# Patient Record
Sex: FEMALE | Race: WHITE | NOT HISPANIC OR LATINO
[De-identification: names, ages, dates, MRNs, and addresses within clinical notes are randomized per-mention and may not be internally consistent; named-entity substitution may affect disease eponyms.]

---

## 2019-01-22 ENCOUNTER — FORM ENCOUNTER (OUTPATIENT)
Age: 59
End: 2019-01-22

## 2019-01-25 ENCOUNTER — FORM ENCOUNTER (OUTPATIENT)
Age: 59
End: 2019-01-25

## 2019-02-10 ENCOUNTER — FORM ENCOUNTER (OUTPATIENT)
Age: 59
End: 2019-02-10

## 2019-02-20 ENCOUNTER — FORM ENCOUNTER (OUTPATIENT)
Age: 59
End: 2019-02-20

## 2020-12-13 ENCOUNTER — FORM ENCOUNTER (OUTPATIENT)
Age: 60
End: 2020-12-13

## 2021-05-10 ENCOUNTER — APPOINTMENT (OUTPATIENT)
Dept: DISASTER EMERGENCY | Facility: OTHER | Age: 61
End: 2021-05-10

## 2021-05-18 ENCOUNTER — APPOINTMENT (OUTPATIENT)
Dept: DISASTER EMERGENCY | Facility: OTHER | Age: 61
End: 2021-05-18
Payer: COMMERCIAL

## 2021-05-18 PROCEDURE — 0012A: CPT

## 2021-11-12 ENCOUNTER — NON-APPOINTMENT (OUTPATIENT)
Age: 61
End: 2021-11-12

## 2021-11-12 DIAGNOSIS — Z86.000 PERSONAL HISTORY OF IN-SITU NEOPLASM OF BREAST: ICD-10-CM

## 2021-11-12 DIAGNOSIS — Z85.41 PERSONAL HISTORY OF MALIGNANT NEOPLASM OF CERVIX UTERI: ICD-10-CM

## 2021-11-12 DIAGNOSIS — N95.2 POSTMENOPAUSAL ATROPHIC VAGINITIS: ICD-10-CM

## 2021-11-12 DIAGNOSIS — N95.1 MENOPAUSAL AND FEMALE CLIMACTERIC STATES: ICD-10-CM

## 2021-11-12 DIAGNOSIS — Z78.9 OTHER SPECIFIED HEALTH STATUS: ICD-10-CM

## 2021-11-12 DIAGNOSIS — Z71.89 OTHER SPECIFIED COUNSELING: ICD-10-CM

## 2021-11-12 DIAGNOSIS — Z72.0 TOBACCO USE: ICD-10-CM

## 2021-11-12 DIAGNOSIS — F17.200 NICOTINE DEPENDENCE, UNSPECIFIED, UNCOMPLICATED: ICD-10-CM

## 2021-11-12 PROBLEM — Z00.00 ENCOUNTER FOR PREVENTIVE HEALTH EXAMINATION: Status: ACTIVE | Noted: 2021-11-12

## 2021-11-12 RX ORDER — OMEPRAZOLE 10 MG/1
10 CAPSULE, DELAYED RELEASE ORAL
Refills: 0 | Status: ACTIVE | COMMUNITY

## 2021-11-12 RX ORDER — MULTIVITAMIN
TABLET ORAL
Refills: 0 | Status: ACTIVE | COMMUNITY

## 2021-11-12 RX ORDER — OXYCODONE 5 MG/1
5 TABLET ORAL
Refills: 0 | Status: ACTIVE | COMMUNITY

## 2021-11-12 RX ORDER — MELATONIN 5 MG
5 CAPSULE ORAL
Refills: 0 | Status: ACTIVE | COMMUNITY

## 2021-12-21 ENCOUNTER — APPOINTMENT (OUTPATIENT)
Dept: OBGYN | Facility: CLINIC | Age: 61
End: 2021-12-21

## 2022-05-17 ENCOUNTER — APPOINTMENT (OUTPATIENT)
Dept: OBGYN | Facility: CLINIC | Age: 62
End: 2022-05-17
Payer: COMMERCIAL

## 2022-05-17 VITALS
HEIGHT: 66 IN | DIASTOLIC BLOOD PRESSURE: 50 MMHG | SYSTOLIC BLOOD PRESSURE: 100 MMHG | BODY MASS INDEX: 19.61 KG/M2 | WEIGHT: 122 LBS

## 2022-05-17 DIAGNOSIS — Z80.1 FAMILY HISTORY OF MALIGNANT NEOPLASM OF TRACHEA, BRONCHUS AND LUNG: ICD-10-CM

## 2022-05-17 DIAGNOSIS — Z82.5 FAMILY HISTORY OF ASTHMA AND OTHER CHRONIC LOWER RESPIRATORY DISEASES: ICD-10-CM

## 2022-05-17 DIAGNOSIS — Z12.39 ENCOUNTER FOR OTHER SCREENING FOR MALIGNANT NEOPLASM OF BREAST: ICD-10-CM

## 2022-05-17 DIAGNOSIS — Z87.891 PERSONAL HISTORY OF NICOTINE DEPENDENCE: ICD-10-CM

## 2022-05-17 PROCEDURE — 99396 PREV VISIT EST AGE 40-64: CPT

## 2022-05-17 RX ORDER — RANITIDINE HCL 25 MG/ML
25 VIAL (ML) INJECTION
Refills: 0 | Status: ACTIVE | COMMUNITY

## 2022-05-17 NOTE — PHYSICAL EXAM
[Chaperone Present] : A chaperone was present in the examining room during all aspects of the physical examination [Appropriately responsive] : appropriately responsive [Alert] : alert [No Acute Distress] : no acute distress [No Lymphadenopathy] : no lymphadenopathy [Regular Rate Rhythm] : regular rate rhythm [No Murmurs] : no murmurs [Soft] : soft [Clear to Auscultation B/L] : clear to auscultation bilaterally [Non-tender] : non-tender [Non-distended] : non-distended [No HSM] : No HSM [No Lesions] : no lesions [No Mass] : no mass [Oriented x3] : oriented x3 [Examination Of The Breasts] : a normal appearance [No Masses] : no breast masses were palpable [Labia Majora] : normal [Labia Minora] : normal [Normal] : normal [Absent] : absent [Uterine Adnexae] : normal [Declined] : Patient declined rectal exam [FreeTextEntry1] : PH [FreeTextEntry8] : No masses nontender bilaterally

## 2022-05-17 NOTE — HISTORY OF PRESENT ILLNESS
[Patient reported mammogram was normal] : Patient reported mammogram was normal [Patient reported PAP Smear was normal] : Patient reported PAP Smear was normal [Patient reported bone density results were abnormal] : Patient reported bone density results were abnormal [HIV test declined] : HIV test declined [Syphilis test declined] : Syphilis test declined [Gonorrhea test declined] : Gonorrhea test declined [Chlamydia test declined] : Chlamydia test declined [Trichomonas test declined] : Trichomonas test declined [HPV test declined] : HPV test declined [Hepatitis B test declined] : Hepatitis B test declined [Hepatitis C test declined] : Hepatitis C test declined [Y] : Positive pregnancy history [postmenopausal] : postmenopausal [Post-Menopause, No Sxs] : post-menopausal, currently without symptoms [Currently Active] : currently active [Men] : men [Vaginal] : vaginal [Hot Flashes] : hot flashes [Night Sweats] : night sweats [No] : none [Patient reported colonoscopy was normal] : Patient reported colonoscopy was normal [TextBox_4] : PT HERE FOR ANNUAL EXAM NO PAP [Mammogramdate] : 1/26/19 [PapSmeardate] : 12/14/20 [BoneDensityDate] : 1/26/19 [TextBox_37] : OSTEOPENIA  [ColonoscopyDate] : 9/2021 [LMPDate] : 2013 [TextBox_43] : Upper endoscopy also normal [PGHxTotal] : 4 [[facility]FullTerm] : 4 [[facility]] : 4 [TextBox_6] : 2013 [FreeTextEntry1] : 2013

## 2022-05-26 ENCOUNTER — OUTPATIENT (OUTPATIENT)
Dept: OUTPATIENT SERVICES | Facility: HOSPITAL | Age: 62
LOS: 1 days | End: 2022-05-26

## 2022-05-26 ENCOUNTER — APPOINTMENT (OUTPATIENT)
Dept: RADIOLOGY | Facility: HOSPITAL | Age: 62
End: 2022-05-26
Payer: COMMERCIAL

## 2022-05-26 DIAGNOSIS — R13.12 DYSPHAGIA, OROPHARYNGEAL PHASE: ICD-10-CM

## 2022-05-26 PROCEDURE — 74220 X-RAY XM ESOPHAGUS 1CNTRST: CPT | Mod: 26

## 2022-07-01 ENCOUNTER — EMERGENCY (EMERGENCY)
Facility: HOSPITAL | Age: 62
LOS: 1 days | Discharge: HOME | End: 2022-07-01
Attending: EMERGENCY MEDICINE
Payer: COMMERCIAL

## 2022-07-01 VITALS
HEART RATE: 64 BPM | RESPIRATION RATE: 20 BRPM | OXYGEN SATURATION: 99 % | TEMPERATURE: 98 F | SYSTOLIC BLOOD PRESSURE: 162 MMHG | DIASTOLIC BLOOD PRESSURE: 81 MMHG

## 2022-07-01 VITALS
HEART RATE: 73 BPM | WEIGHT: 117.95 LBS | TEMPERATURE: 98 F | OXYGEN SATURATION: 99 % | HEIGHT: 66 IN | SYSTOLIC BLOOD PRESSURE: 168 MMHG | DIASTOLIC BLOOD PRESSURE: 84 MMHG | RESPIRATION RATE: 18 BRPM

## 2022-07-01 LAB
ALBUMIN SERPL ELPH-MCNC: 5.1 G/DL — SIGNIFICANT CHANGE UP (ref 3.3–5.2)
ALP SERPL-CCNC: 73 U/L — SIGNIFICANT CHANGE UP (ref 40–120)
ALT FLD-CCNC: 14 U/L — SIGNIFICANT CHANGE UP
ANION GAP SERPL CALC-SCNC: 9 MMOL/L — SIGNIFICANT CHANGE UP (ref 5–17)
AST SERPL-CCNC: 21 U/L — SIGNIFICANT CHANGE UP
BASOPHILS # BLD AUTO: 0.03 K/UL — SIGNIFICANT CHANGE UP (ref 0–0.2)
BASOPHILS NFR BLD AUTO: 0.4 % — SIGNIFICANT CHANGE UP (ref 0–2)
BILIRUB SERPL-MCNC: 0.4 MG/DL — SIGNIFICANT CHANGE UP (ref 0.4–2)
BUN SERPL-MCNC: 9.7 MG/DL — SIGNIFICANT CHANGE UP (ref 8–20)
CALCIUM SERPL-MCNC: 9.4 MG/DL — SIGNIFICANT CHANGE UP (ref 8.6–10.2)
CHLORIDE SERPL-SCNC: 106 MMOL/L — SIGNIFICANT CHANGE UP (ref 98–107)
CO2 SERPL-SCNC: 26 MMOL/L — SIGNIFICANT CHANGE UP (ref 22–29)
CREAT SERPL-MCNC: 0.56 MG/DL — SIGNIFICANT CHANGE UP (ref 0.5–1.3)
EGFR: 104 ML/MIN/1.73M2 — SIGNIFICANT CHANGE UP
EOSINOPHIL # BLD AUTO: 0.11 K/UL — SIGNIFICANT CHANGE UP (ref 0–0.5)
EOSINOPHIL NFR BLD AUTO: 1.5 % — SIGNIFICANT CHANGE UP (ref 0–6)
GLUCOSE SERPL-MCNC: 107 MG/DL — HIGH (ref 70–99)
HCT VFR BLD CALC: 37.9 % — SIGNIFICANT CHANGE UP (ref 34.5–45)
HGB BLD-MCNC: 12.9 G/DL — SIGNIFICANT CHANGE UP (ref 11.5–15.5)
IMM GRANULOCYTES NFR BLD AUTO: 0.4 % — SIGNIFICANT CHANGE UP (ref 0–1.5)
LYMPHOCYTES # BLD AUTO: 1.69 K/UL — SIGNIFICANT CHANGE UP (ref 1–3.3)
LYMPHOCYTES # BLD AUTO: 23.3 % — SIGNIFICANT CHANGE UP (ref 13–44)
MAGNESIUM SERPL-MCNC: 2 MG/DL — SIGNIFICANT CHANGE UP (ref 1.6–2.6)
MCHC RBC-ENTMCNC: 30.9 PG — SIGNIFICANT CHANGE UP (ref 27–34)
MCHC RBC-ENTMCNC: 34 GM/DL — SIGNIFICANT CHANGE UP (ref 32–36)
MCV RBC AUTO: 90.7 FL — SIGNIFICANT CHANGE UP (ref 80–100)
MONOCYTES # BLD AUTO: 0.29 K/UL — SIGNIFICANT CHANGE UP (ref 0–0.9)
MONOCYTES NFR BLD AUTO: 4 % — SIGNIFICANT CHANGE UP (ref 2–14)
NEUTROPHILS # BLD AUTO: 5.1 K/UL — SIGNIFICANT CHANGE UP (ref 1.8–7.4)
NEUTROPHILS NFR BLD AUTO: 70.4 % — SIGNIFICANT CHANGE UP (ref 43–77)
PLATELET # BLD AUTO: 253 K/UL — SIGNIFICANT CHANGE UP (ref 150–400)
POTASSIUM SERPL-MCNC: 3.7 MMOL/L — SIGNIFICANT CHANGE UP (ref 3.5–5.3)
POTASSIUM SERPL-SCNC: 3.7 MMOL/L — SIGNIFICANT CHANGE UP (ref 3.5–5.3)
PROT SERPL-MCNC: 7.2 G/DL — SIGNIFICANT CHANGE UP (ref 6.6–8.7)
RAPID RVP RESULT: SIGNIFICANT CHANGE UP
RBC # BLD: 4.18 M/UL — SIGNIFICANT CHANGE UP (ref 3.8–5.2)
RBC # FLD: 13.4 % — SIGNIFICANT CHANGE UP (ref 10.3–14.5)
SARS-COV-2 RNA SPEC QL NAA+PROBE: SIGNIFICANT CHANGE UP
SODIUM SERPL-SCNC: 141 MMOL/L — SIGNIFICANT CHANGE UP (ref 135–145)
TSH SERPL-MCNC: 0.66 UIU/ML — SIGNIFICANT CHANGE UP (ref 0.27–4.2)
WBC # BLD: 7.25 K/UL — SIGNIFICANT CHANGE UP (ref 3.8–10.5)
WBC # FLD AUTO: 7.25 K/UL — SIGNIFICANT CHANGE UP (ref 3.8–10.5)

## 2022-07-01 PROCEDURE — 73030 X-RAY EXAM OF SHOULDER: CPT | Mod: 26,RT

## 2022-07-01 PROCEDURE — 96374 THER/PROPH/DIAG INJ IV PUSH: CPT

## 2022-07-01 PROCEDURE — 85025 COMPLETE CBC W/AUTO DIFF WBC: CPT

## 2022-07-01 PROCEDURE — 96375 TX/PRO/DX INJ NEW DRUG ADDON: CPT

## 2022-07-01 PROCEDURE — 99285 EMERGENCY DEPT VISIT HI MDM: CPT

## 2022-07-01 PROCEDURE — 36415 COLL VENOUS BLD VENIPUNCTURE: CPT

## 2022-07-01 PROCEDURE — 84443 ASSAY THYROID STIM HORMONE: CPT

## 2022-07-01 PROCEDURE — 99285 EMERGENCY DEPT VISIT HI MDM: CPT | Mod: 25

## 2022-07-01 PROCEDURE — 0225U NFCT DS DNA&RNA 21 SARSCOV2: CPT

## 2022-07-01 PROCEDURE — 83735 ASSAY OF MAGNESIUM: CPT

## 2022-07-01 PROCEDURE — 70450 CT HEAD/BRAIN W/O DYE: CPT | Mod: MA

## 2022-07-01 PROCEDURE — 80053 COMPREHEN METABOLIC PANEL: CPT

## 2022-07-01 PROCEDURE — 93005 ELECTROCARDIOGRAM TRACING: CPT

## 2022-07-01 PROCEDURE — 73030 X-RAY EXAM OF SHOULDER: CPT

## 2022-07-01 PROCEDURE — 70450 CT HEAD/BRAIN W/O DYE: CPT | Mod: 26,MA

## 2022-07-01 PROCEDURE — 93010 ELECTROCARDIOGRAM REPORT: CPT

## 2022-07-01 RX ORDER — SODIUM CHLORIDE 9 MG/ML
1000 INJECTION INTRAMUSCULAR; INTRAVENOUS; SUBCUTANEOUS ONCE
Refills: 0 | Status: COMPLETED | OUTPATIENT
Start: 2022-07-01 | End: 2022-07-01

## 2022-07-01 RX ORDER — KETOROLAC TROMETHAMINE 30 MG/ML
30 SYRINGE (ML) INJECTION ONCE
Refills: 0 | Status: DISCONTINUED | OUTPATIENT
Start: 2022-07-01 | End: 2022-07-01

## 2022-07-01 RX ORDER — METHOCARBAMOL 500 MG/1
1000 TABLET, FILM COATED ORAL ONCE
Refills: 0 | Status: COMPLETED | OUTPATIENT
Start: 2022-07-01 | End: 2022-07-01

## 2022-07-01 RX ORDER — LIDOCAINE 4 G/100G
1 CREAM TOPICAL ONCE
Refills: 0 | Status: COMPLETED | OUTPATIENT
Start: 2022-07-01 | End: 2022-07-01

## 2022-07-01 RX ORDER — METOCLOPRAMIDE HCL 10 MG
10 TABLET ORAL ONCE
Refills: 0 | Status: COMPLETED | OUTPATIENT
Start: 2022-07-01 | End: 2022-07-01

## 2022-07-01 RX ORDER — METHOCARBAMOL 500 MG/1
2 TABLET, FILM COATED ORAL
Qty: 40 | Refills: 0
Start: 2022-07-01 | End: 2022-07-05

## 2022-07-01 RX ORDER — LIDOCAINE 4 G/100G
1 CREAM TOPICAL
Qty: 3 | Refills: 0
Start: 2022-07-01 | End: 2022-07-03

## 2022-07-01 RX ADMIN — METHOCARBAMOL 1000 MILLIGRAM(S): 500 TABLET, FILM COATED ORAL at 11:45

## 2022-07-01 RX ADMIN — LIDOCAINE 1 PATCH: 4 CREAM TOPICAL at 10:29

## 2022-07-01 RX ADMIN — Medication 30 MILLIGRAM(S): at 10:29

## 2022-07-01 RX ADMIN — Medication 10 MILLIGRAM(S): at 13:45

## 2022-07-01 RX ADMIN — SODIUM CHLORIDE 1000 MILLILITER(S): 9 INJECTION INTRAMUSCULAR; INTRAVENOUS; SUBCUTANEOUS at 10:30

## 2022-07-01 RX ADMIN — LIDOCAINE 1 PATCH: 4 CREAM TOPICAL at 13:45

## 2022-07-01 NOTE — ED PROVIDER NOTE - NS ED ROS FT
Denies f/c/n/v/cp/sob/palpitations/ cough/rash/headache/dizziness/abd.pain/d/c/dysuria/hematuria  +RIGHT ARM PAIN TINGLING + right leg tingling  and pain to buttocks

## 2022-07-01 NOTE — ED PROVIDER NOTE - OBJECTIVE STATEMENT
63yo F hx of anxiety, asthma pw right arm pain and numbness to right arm and right leg. notes yesterday morning felt like she pulled her right arm when putting on a shirt had some pain but didn't think much of it. by 7pm last night felt tingling "pins and needles" to right arm and right leg, went to sleep and still had the same intermittent symptoms now with right buttocks pain as well. Denies f/c/n/v/cp/sob/palpitations/ cough/rash/headache/dizziness/abd.pain/d/c/dysuria/hematuria. 61yo F hx of anxiety, asthma pw right arm pain and numbness to right arm and right leg. notes yesterday morning felt like she pulled her right arm when putting on a shirt had some pain but didn't think much of it. by 7pm last night felt tingling "pins and needles" to right arm and right leg, went to sleep and still had the same intermittent symptoms now with right buttocks pain as well. Denies f/c/n/v/cp/sob/palpitations/ cough/rash/headache/dizziness/abd.pain/d/c/dysuria/hematuria. notes numbness to sole of right foot. no fecal or urinary incontinence. notes that sometimes gets numbness/tingling similar to this on the left side was told it was anxiety in the past - admits to being very stressed lately "tearful" when talking about it.

## 2022-07-01 NOTE — ED PROVIDER NOTE - PROGRESS NOTE DETAILS
Richard: Ambulating independently in ED. Tolerating PO. Stable for dc. Patient understands return precautions and f/u.

## 2022-07-01 NOTE — ED ADULT NURSE NOTE - OBJECTIVE STATEMENT
Pt c/o right shoulder, RUE, right torso and RLE pain.  Pt has history of fall aprox 2 months ago and injured same area.  Pt moving all ext well.  No obvious deformities noted.

## 2022-07-01 NOTE — ED PROVIDER NOTE - CLINICAL SUMMARY MEDICAL DECISION MAKING FREE TEXT BOX
Like muscular back and extremity pain. Possible stress and fatigue causing headache. Will like neurological in nature. Neuro exam normal . Vitally stable. Will order labs, ct.

## 2022-07-01 NOTE — ED PROVIDER NOTE - ATTENDING CONTRIBUTION TO CARE
see mdm for residents contricution    reproducible on exam possible msk/radiculopathy hx of similar with anxiety?; pain control assess gait; ct head labs reassess

## 2022-07-01 NOTE — ED PROVIDER NOTE - PHYSICAL EXAMINATION
Head: atraumatic, normacephalic  Face: atraumatic, no crepitus no orbiral/maxillary/mandibular ttp  throat: uvula midline no exudates  eyes: perrla eomi  heart: rrr s1s2  lungs: ctab  abd: soft, nt nd +bs no rebound/guarding no cva ttp  skin: warm  LE: no swelling, no calf ttp  back: no midline cervical/thoracic/lumbar ttp + right buttocks ttp + ttp to right upper arm/deltoid region radial pulses 2+; dorsalis pedis 2+  neuro: aa ox 3 cn iii-xii intact strength 5/5 normal finger to nose

## 2022-07-01 NOTE — ED ADULT NURSE REASSESSMENT NOTE - NS ED NURSE REASSESS COMMENT FT1
Pt states she feels better than when she came into ED.  Pt amb ad skye to BR with steady gait.
Pt received @ this time, pt A&OX3, denies any pain/itchiness.  Pt in NAD, no diff breathing/swallowing.  CM in place, NSR, HR in low 90's, VSS.  Pt states she feels better than when she came into ED.  Will continue to monitor.

## 2022-07-01 NOTE — ED ADULT TRIAGE NOTE - CHIEF COMPLAINT QUOTE
pt c/o headache, right side numbness to right shoulder & right leg, denies dizziness  vision WNL, states she woke up yesterday & moved the wrong way & hurt her  arm   A&Ox3, resp wnl

## 2022-07-01 NOTE — ED PROVIDER NOTE - PATIENT PORTAL LINK FT
You can access the FollowMyHealth Patient Portal offered by [facility] by registering at the following website: [email or web address]. By joining [employer]’s FollowMyHealth portal, you will also be able to view your health information using other applications (apps) compatible with our system.

## 2023-05-04 ENCOUNTER — OFFICE (OUTPATIENT)
Dept: URBAN - METROPOLITAN AREA CLINIC 104 | Facility: CLINIC | Age: 63
Setting detail: OPHTHALMOLOGY
End: 2023-05-04
Payer: COMMERCIAL

## 2023-05-04 DIAGNOSIS — D23.122: ICD-10-CM

## 2023-05-04 DIAGNOSIS — H25.13: ICD-10-CM

## 2023-05-04 PROCEDURE — 99203 OFFICE O/P NEW LOW 30 MIN: CPT | Performed by: OPTOMETRIST

## 2023-05-04 ASSESSMENT — AXIALLENGTH_DERIVED
OD_AL: 22.38
OS_AL: 22.24

## 2023-05-04 ASSESSMENT — REFRACTION_AUTOREFRACTION
OD_SPHERE: +1.25
OD_AXIS: 015
OD_CYLINDER: -0.75
OS_SPHERE: +1.50
OS_CYLINDER: -0.50
OS_AXIS: 158

## 2023-05-04 ASSESSMENT — REFRACTION_CURRENTRX
OS_VPRISM_DIRECTION: SV
OD_SPHERE: +3.00
OS_OVR_VA: 20/
OS_SPHERE: +3.25
OD_VPRISM_DIRECTION: SV
OD_CYLINDER: SPHERE
OS_AXIS: 122
OS_CYLINDER: -0.25
OD_OVR_VA: 20/

## 2023-05-04 ASSESSMENT — KERATOMETRY
OS_AXISANGLE_DEGREES: 092
OS_K2POWER_DIOPTERS: 46.62
OS_K1POWER_DIOPTERS: 45.55
OD_K2POWER_DIOPTERS: 46.49
OD_AXISANGLE_DEGREES: 094
OD_K1POWER_DIOPTERS: 45.61

## 2023-05-04 ASSESSMENT — SPHEQUIV_DERIVED
OS_SPHEQUIV: 1.25
OD_SPHEQUIV: 0.875

## 2023-05-04 ASSESSMENT — CONFRONTATIONAL VISUAL FIELD TEST (CVF)
OS_FINDINGS: FULL
OD_FINDINGS: FULL

## 2023-05-04 ASSESSMENT — TONOMETRY
OD_IOP_MMHG: 16
OS_IOP_MMHG: 16

## 2023-05-04 ASSESSMENT — VISUAL ACUITY
OD_BCVA: 20/30-2
OS_BCVA: 20/30-2

## 2023-05-23 ENCOUNTER — APPOINTMENT (OUTPATIENT)
Dept: OBGYN | Facility: CLINIC | Age: 63
End: 2023-05-23
Payer: COMMERCIAL

## 2023-05-23 ENCOUNTER — NON-APPOINTMENT (OUTPATIENT)
Age: 63
End: 2023-05-23

## 2023-05-23 VITALS
HEIGHT: 66 IN | BODY MASS INDEX: 20.41 KG/M2 | SYSTOLIC BLOOD PRESSURE: 128 MMHG | DIASTOLIC BLOOD PRESSURE: 79 MMHG | WEIGHT: 127 LBS

## 2023-05-23 PROCEDURE — 99396 PREV VISIT EST AGE 40-64: CPT

## 2023-05-23 NOTE — PLAN
[FreeTextEntry1] : Mammogram scheduled patient to continue iron therapy per Dr. Wallis hematologist, vitamin D3 multivitamin discussed will use warm soaks to sebaceous cyst right upper labia, patient to return in 12 months

## 2023-05-23 NOTE — PHYSICAL EXAM
[Chaperone Present] : A chaperone was present in the examining room during all aspects of the physical examination [Appropriately responsive] : appropriately responsive [Alert] : alert [No Acute Distress] : no acute distress [No Lymphadenopathy] : no lymphadenopathy [Regular Rate Rhythm] : regular rate rhythm [No Murmurs] : no murmurs [Clear to Auscultation B/L] : clear to auscultation bilaterally [Soft] : soft [Non-tender] : non-tender [Non-distended] : non-distended [No HSM] : No HSM [No Lesions] : no lesions [No Mass] : no mass [Oriented x3] : oriented x3 [Examination Of The Breasts] : a normal appearance [No Masses] : no breast masses were palpable [Labia Majora] : normal [Labia Minora] : normal [Normal] : normal [Anteversion] : anteverted [Uterine Adnexae] : normal [Declined] : Patient declined rectal exam [FreeTextEntry6] : Symmetrical no masses [FreeTextEntry1] : Sebaceous cyst less than 0.5 cm right upper labia no local sepsis [FreeTextEntry8] : No masses nontender

## 2023-05-23 NOTE — HISTORY OF PRESENT ILLNESS
[HIV test declined] : HIV test declined [Syphilis test declined] : Syphilis test declined [Gonorrhea test declined] : Gonorrhea test declined [Chlamydia test declined] : Chlamydia test declined [Trichomonas test declined] : Trichomonas test declined [HPV test declined] : HPV test declined [Hepatitis B test declined] : Hepatitis B test declined [Hepatitis C test declined] : Hepatitis C test declined [postmenopausal] : postmenopausal [N] : Patient does not use contraception [Y] : Positive pregnancy history [No] : Patient does not have concerns regarding sex [Currently Active] : currently active [Men] : men [Vaginal] : vaginal [TextBox_4] : PT PRESENT TODAY FOR A ANNUAL EXAM *NO PAP- RAUL* \par LMP: 2013 [Mammogramdate] : 10/31/22 [TextBox_19] : BR1 [PapSmeardate] : 2020 [TextBox_31] : MICKI *RAUL*  [BoneDensityDate] : 10/31/22 [TextBox_37] : OSTEOPENIA  [ColonoscopyDate] : 2022 [TextBox_43] : WNL [LMPDate] : 2013 [MensesFreq] : 28 [MensesLength] : 5 [PGHxTotal] : 5 [[facility]] : 4 [[facility]] : 4 [PGHxABSpont] : 1 [TextBox_6] : 2013 [TextBox_9] : 14 [TextBox_13] : 28 [TextBox_15] : 4 [FreeTextEntry1] : 2013

## 2023-05-23 NOTE — REVIEW OF SYSTEMS
[Patient Intake Form Reviewed] : Patient intake form was reviewed [Negative] : Endocrine [History of Anemia] : history of anemia

## 2023-09-27 ENCOUNTER — EMERGENCY (EMERGENCY)
Facility: HOSPITAL | Age: 63
LOS: 1 days | Discharge: HOME | End: 2023-09-27
Attending: EMERGENCY MEDICINE
Payer: COMMERCIAL

## 2023-09-27 VITALS
HEART RATE: 82 BPM | WEIGHT: 117.95 LBS | SYSTOLIC BLOOD PRESSURE: 181 MMHG | RESPIRATION RATE: 20 BRPM | OXYGEN SATURATION: 98 % | HEIGHT: 66 IN | DIASTOLIC BLOOD PRESSURE: 112 MMHG | TEMPERATURE: 98 F

## 2023-09-27 LAB
RAPID RVP RESULT: SIGNIFICANT CHANGE UP
SARS-COV-2 RNA SPEC QL NAA+PROBE: SIGNIFICANT CHANGE UP

## 2023-09-27 PROCEDURE — 99283 EMERGENCY DEPT VISIT LOW MDM: CPT

## 2023-09-27 PROCEDURE — 99284 EMERGENCY DEPT VISIT MOD MDM: CPT

## 2023-09-27 PROCEDURE — 0225U NFCT DS DNA&RNA 21 SARSCOV2: CPT

## 2023-09-27 RX ORDER — VALACYCLOVIR 500 MG/1
1000 TABLET, FILM COATED ORAL ONCE
Refills: 0 | Status: COMPLETED | OUTPATIENT
Start: 2023-09-27 | End: 2023-09-27

## 2023-09-27 RX ORDER — VALACYCLOVIR 500 MG/1
1 TABLET, FILM COATED ORAL
Qty: 21 | Refills: 0
Start: 2023-09-27 | End: 2023-10-03

## 2023-09-27 RX ORDER — DIPHENHYDRAMINE HYDROCHLORIDE AND LIDOCAINE HYDROCHLORIDE AND ALUMINUM HYDROXIDE AND MAGNESIUM HYDRO
5 KIT ONCE
Refills: 0 | Status: COMPLETED | OUTPATIENT
Start: 2023-09-27 | End: 2023-09-27

## 2023-09-27 RX ADMIN — VALACYCLOVIR 1000 MILLIGRAM(S): 500 TABLET, FILM COATED ORAL at 09:57

## 2023-09-27 RX ADMIN — Medication 60 MILLIGRAM(S): at 09:57

## 2023-09-27 RX ADMIN — DIPHENHYDRAMINE HYDROCHLORIDE AND LIDOCAINE HYDROCHLORIDE AND ALUMINUM HYDROXIDE AND MAGNESIUM HYDRO 5 MILLILITER(S): KIT at 09:56

## 2023-09-27 NOTE — ED STATDOCS - CLINICAL SUMMARY MEDICAL DECISION MAKING FREE TEXT BOX
64 y/o female with vesicular oral rash. Will treat with valtrex 64 y/o female with vesicular oral rash, no angioedema, airways patent at this time. Patient will stop course of penicillin and start valtrex and magic mouthwash

## 2023-09-27 NOTE — ED STATDOCS - ENMT, MLM
Nasal mucosa clear.  Mouth with normal mucosa  Throat has no vesicles, no oropharyngeal exudates and uvula is midline. Airways patent. Nasal mucosa clear.  Mouth with normal mucosa  Throat has no vesicles, no oropharyngeal exudates and uvula is midline.

## 2023-09-27 NOTE — ED STATDOCS - PATIENT PORTAL LINK FT
You can access the FollowMyHealth Patient Portal offered by [facility] by registering at the following website: [email or web address]. By joining [employer]’s FollowMyHealth portal, you will also be able to view your health information using other applications (apps) compatible with our system.

## 2023-09-27 NOTE — ED STATDOCS - NSFOLLOWUPINSTRUCTIONS_ED_ALL_ED_FT
Patient advised to take medication as prescribed   Stay away from pregnant females and kids  Eat soft food  Change her toothbrush  Follow-up with the PCP in 1 week  Return promptly in case of any worsening symptoms  Do not share food

## 2023-09-27 NOTE — ED ADULT TRIAGE NOTE - CHIEF COMPLAINT QUOTE
Pt arrives to ED c/o upper lip swelling STARTED ON  Monday - pt had dental work on Thursday and  was started on PCN which is making swelling worse. Pt denies SOB / difficulty controlling own saliva or drooling. Breathing easy and unlabored

## 2023-09-27 NOTE — ED STATDOCS - OBJECTIVE STATEMENT
62 y/o female with no pertinent PMHx presents with painful right upper lip swelling for 5 days that started as a pimple. Denies itching. Reports having a tooth that needs to be extracted. Reports going to urgent care and was started on penicillin

## 2023-09-27 NOTE — ED STATDOCS - SKIN, MLM
(+) vesicular oral rash, skin normal color for race, warm, dry and intact. (+) vesicular oral rash, no angioedema, skin normal color for race, warm, dry and intact. (+) vesicular on rt upper inner lip mucosa rash, no angioedema, skin normal color for race, warm, dry and intact.

## 2023-09-27 NOTE — ED STATDOCS - DISCHARGE DATE
27-Sep-2023 Cont. o/p regimen. Consider increasing imdur for more antianginal effects. F/U with Dr. Hayes on 11/29 for intervention as stated above.

## 2024-05-28 ENCOUNTER — APPOINTMENT (OUTPATIENT)
Dept: OBGYN | Facility: CLINIC | Age: 64
End: 2024-05-28
Payer: COMMERCIAL

## 2024-05-28 VITALS
HEART RATE: 61 BPM | BODY MASS INDEX: 18.16 KG/M2 | OXYGEN SATURATION: 97 % | SYSTOLIC BLOOD PRESSURE: 108 MMHG | HEIGHT: 66 IN | WEIGHT: 113 LBS | DIASTOLIC BLOOD PRESSURE: 51 MMHG

## 2024-05-28 DIAGNOSIS — D50.8 OTHER IRON DEFICIENCY ANEMIAS: ICD-10-CM

## 2024-05-28 DIAGNOSIS — Z01.419 ENCOUNTER FOR GYNECOLOGICAL EXAMINATION (GENERAL) (ROUTINE) W/OUT ABNORMAL FINDINGS: ICD-10-CM

## 2024-05-28 DIAGNOSIS — Z13.820 ENCOUNTER FOR SCREENING FOR OSTEOPOROSIS: ICD-10-CM

## 2024-05-28 DIAGNOSIS — F41.9 ANXIETY DISORDER, UNSPECIFIED: ICD-10-CM

## 2024-05-28 PROCEDURE — 99396 PREV VISIT EST AGE 40-64: CPT

## 2024-05-28 NOTE — HISTORY OF PRESENT ILLNESS
[HIV test declined] : HIV test declined [Syphilis test declined] : Syphilis test declined [Gonorrhea test declined] : Gonorrhea test declined [Chlamydia test declined] : Chlamydia test declined [Trichomonas test declined] : Trichomonas test declined [HPV test declined] : HPV test declined [Hepatitis B test declined] : Hepatitis B test declined [Hepatitis C test declined] : Hepatitis C test declined [N] : Patient does not use contraception [Y] : Positive pregnancy history [Currently Active] : currently active [Men] : men [Vaginal] : vaginal [No] : No [TextBox_4] : PT HERE FOR ANNUAL EXAM  LMP: 2013 RAUL [Mammogramdate] : 3/23/24 [TextBox_19] : BR1 [PapSmeardate] : 2020 [TextBox_31] : WNL*NO PAP PT HAD RAUL* [BoneDensityDate] : 10/31/22 [TextBox_37] : OSTEOPENIA  [LMPDate] : 2013 [PGHxTotal] : 5 [[facility]Full[facility]] : 4 [[facility]] : 4 [PGHxABSpont] : 1 [TextBox_6] : 2013 [TextBox_9] : 14 [TextBox_13] : 28 [TextBox_15] : 4 [FreeTextEntry1] : 2013

## 2024-05-28 NOTE — PLAN
[FreeTextEntry1] : Patient up-to-date with mammogram and sonogram will need bone density as she has a history of osteopenia she will follow-up with hematology oncology and GI for further workup of secondary anemia and tarry stools.  Patient will return in 1 year

## 2024-05-28 NOTE — PHYSICAL EXAM
[Chaperone Present] : A chaperone was present in the examining room during all aspects of the physical examination [FreeTextEntry2] : KF [Appropriately responsive] : appropriately responsive [Alert] : alert [No Acute Distress] : no acute distress [No Lymphadenopathy] : no lymphadenopathy [Regular Rate Rhythm] : regular rate rhythm [No Murmurs] : no murmurs [Clear to Auscultation B/L] : clear to auscultation bilaterally [Soft] : soft [Non-tender] : non-tender [Non-distended] : non-distended [No HSM] : No HSM [No Lesions] : no lesions [No Mass] : no mass [Oriented x3] : oriented x3 [FreeTextEntry6] : No masses nontender bilaterally [FreeTextEntry7] : Soft nontender [Examination Of The Breasts] : a normal appearance [Normal] : normal [No Masses] : no breast masses were palpable

## 2024-05-28 NOTE — REVIEW OF SYSTEMS
[Patient Intake Form Reviewed] : Patient intake form was reviewed [Recent Weight Loss (___ Lbs)] : recent [unfilled] ~Ulb weight loss [History of Anemia] : history of anemia [Negative] : Endocrine [de-identified] : Secondary anemia

## 2025-04-11 ENCOUNTER — OUTPATIENT (OUTPATIENT)
Dept: OUTPATIENT SERVICES | Facility: HOSPITAL | Age: 65
LOS: 1 days | End: 2025-04-11
Payer: COMMERCIAL

## 2025-04-11 VITALS
HEIGHT: 66 IN | SYSTOLIC BLOOD PRESSURE: 122 MMHG | TEMPERATURE: 97 F | OXYGEN SATURATION: 99 % | WEIGHT: 117.29 LBS | RESPIRATION RATE: 18 BRPM | HEART RATE: 64 BPM | DIASTOLIC BLOOD PRESSURE: 64 MMHG

## 2025-04-11 DIAGNOSIS — J44.9 CHRONIC OBSTRUCTIVE PULMONARY DISEASE, UNSPECIFIED: ICD-10-CM

## 2025-04-11 DIAGNOSIS — Z01.818 ENCOUNTER FOR OTHER PREPROCEDURAL EXAMINATION: ICD-10-CM

## 2025-04-11 DIAGNOSIS — Z90.710 ACQUIRED ABSENCE OF BOTH CERVIX AND UTERUS: Chronic | ICD-10-CM

## 2025-04-11 DIAGNOSIS — Z29.9 ENCOUNTER FOR PROPHYLACTIC MEASURES, UNSPECIFIED: ICD-10-CM

## 2025-04-11 DIAGNOSIS — Z90.89 ACQUIRED ABSENCE OF OTHER ORGANS: Chronic | ICD-10-CM

## 2025-04-11 DIAGNOSIS — G57.61 LESION OF PLANTAR NERVE, RIGHT LOWER LIMB: ICD-10-CM

## 2025-04-11 LAB
A1C WITH ESTIMATED AVERAGE GLUCOSE RESULT: 6 % — HIGH (ref 4–5.6)
ANION GAP SERPL CALC-SCNC: 10 MMOL/L — SIGNIFICANT CHANGE UP (ref 5–17)
BASOPHILS # BLD AUTO: 0.04 K/UL — SIGNIFICANT CHANGE UP (ref 0–0.2)
BASOPHILS NFR BLD AUTO: 0.5 % — SIGNIFICANT CHANGE UP (ref 0–2)
BUN SERPL-MCNC: 11.6 MG/DL — SIGNIFICANT CHANGE UP (ref 8–20)
CALCIUM SERPL-MCNC: 9.5 MG/DL — SIGNIFICANT CHANGE UP (ref 8.4–10.5)
CHLORIDE SERPL-SCNC: 104 MMOL/L — SIGNIFICANT CHANGE UP (ref 96–108)
CO2 SERPL-SCNC: 27 MMOL/L — SIGNIFICANT CHANGE UP (ref 22–29)
CREAT SERPL-MCNC: 0.59 MG/DL — SIGNIFICANT CHANGE UP (ref 0.5–1.3)
EGFR: 101 ML/MIN/1.73M2 — SIGNIFICANT CHANGE UP
EGFR: 101 ML/MIN/1.73M2 — SIGNIFICANT CHANGE UP
EOSINOPHIL # BLD AUTO: 0.35 K/UL — SIGNIFICANT CHANGE UP (ref 0–0.5)
EOSINOPHIL NFR BLD AUTO: 4.8 % — SIGNIFICANT CHANGE UP (ref 0–6)
ESTIMATED AVERAGE GLUCOSE: 126 MG/DL — HIGH (ref 68–114)
GLUCOSE SERPL-MCNC: 100 MG/DL — HIGH (ref 70–99)
HCT VFR BLD CALC: 37.9 % — SIGNIFICANT CHANGE UP (ref 34.5–45)
HGB BLD-MCNC: 12.2 G/DL — SIGNIFICANT CHANGE UP (ref 11.5–15.5)
IMM GRANULOCYTES # BLD AUTO: 0.02 K/UL — SIGNIFICANT CHANGE UP (ref 0–0.07)
IMM GRANULOCYTES NFR BLD AUTO: 0.3 % — SIGNIFICANT CHANGE UP (ref 0–0.9)
LYMPHOCYTES # BLD AUTO: 2.82 K/UL — SIGNIFICANT CHANGE UP (ref 1–3.3)
LYMPHOCYTES NFR BLD AUTO: 38.4 % — SIGNIFICANT CHANGE UP (ref 13–44)
MCHC RBC-ENTMCNC: 28.6 PG — SIGNIFICANT CHANGE UP (ref 27–34)
MCHC RBC-ENTMCNC: 32.2 G/DL — SIGNIFICANT CHANGE UP (ref 32–36)
MCV RBC AUTO: 88.8 FL — SIGNIFICANT CHANGE UP (ref 80–100)
MONOCYTES # BLD AUTO: 0.38 K/UL — SIGNIFICANT CHANGE UP (ref 0–0.9)
MONOCYTES NFR BLD AUTO: 5.2 % — SIGNIFICANT CHANGE UP (ref 2–14)
NEUTROPHILS # BLD AUTO: 3.73 K/UL — SIGNIFICANT CHANGE UP (ref 1.8–7.4)
NEUTROPHILS NFR BLD AUTO: 50.8 % — SIGNIFICANT CHANGE UP (ref 43–77)
NRBC # BLD AUTO: 0 K/UL — SIGNIFICANT CHANGE UP (ref 0–0)
NRBC # FLD: 0 K/UL — SIGNIFICANT CHANGE UP (ref 0–0)
NRBC BLD AUTO-RTO: 0 /100 WBCS — SIGNIFICANT CHANGE UP (ref 0–0)
PLATELET # BLD AUTO: 304 K/UL — SIGNIFICANT CHANGE UP (ref 150–400)
PMV BLD: 10.9 FL — SIGNIFICANT CHANGE UP (ref 7–13)
POTASSIUM SERPL-MCNC: 4.6 MMOL/L — SIGNIFICANT CHANGE UP (ref 3.5–5.3)
POTASSIUM SERPL-SCNC: 4.6 MMOL/L — SIGNIFICANT CHANGE UP (ref 3.5–5.3)
RBC # BLD: 4.27 M/UL — SIGNIFICANT CHANGE UP (ref 3.8–5.2)
RBC # FLD: 13 % — SIGNIFICANT CHANGE UP (ref 10.3–14.5)
SODIUM SERPL-SCNC: 141 MMOL/L — SIGNIFICANT CHANGE UP (ref 135–145)
WBC # BLD: 7.34 K/UL — SIGNIFICANT CHANGE UP (ref 3.8–10.5)
WBC # FLD AUTO: 7.34 K/UL — SIGNIFICANT CHANGE UP (ref 3.8–10.5)

## 2025-04-11 PROCEDURE — 36415 COLL VENOUS BLD VENIPUNCTURE: CPT

## 2025-04-11 PROCEDURE — 83036 HEMOGLOBIN GLYCOSYLATED A1C: CPT

## 2025-04-11 PROCEDURE — 80048 BASIC METABOLIC PNL TOTAL CA: CPT

## 2025-04-11 PROCEDURE — 93010 ELECTROCARDIOGRAM REPORT: CPT | Mod: 77

## 2025-04-11 PROCEDURE — 85025 COMPLETE CBC W/AUTO DIFF WBC: CPT

## 2025-04-11 PROCEDURE — 93005 ELECTROCARDIOGRAM TRACING: CPT

## 2025-04-11 PROCEDURE — G0463: CPT

## 2025-04-11 PROCEDURE — 93010 ELECTROCARDIOGRAM REPORT: CPT

## 2025-04-11 RX ORDER — ALPRAZOLAM 0.5 MG
1 TABLET, EXTENDED RELEASE 24 HR ORAL
Refills: 0 | DISCHARGE

## 2025-04-11 RX ORDER — ALBUTEROL SULFATE 2.5 MG/3ML
2 VIAL, NEBULIZER (ML) INHALATION
Refills: 0 | DISCHARGE

## 2025-04-11 RX ORDER — ONDANSETRON HCL/PF 4 MG/2 ML
1 VIAL (ML) INJECTION
Refills: 0 | DISCHARGE
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
## 2025-04-11 NOTE — H&P PST ADULT - HISTORY OF PRESENT ILLNESS
Patient reports experiencing a burning and throbbing pain        on her right foot between her toes in 2022    admits to moderate to severe throbbing pain     +tingling     MRI revealed Cheryl Perales is a 65 year old female with PMH significant for mild COPD, former smoker, GI bleed (2021) required 2 blood transfusions, anemia (2021, has since resolved but follows with hematologist), and esophageal spasms. Patient reports experiencing a burning and throbbing sensation to her right foot, between her 2nd and 3rd toe, since 2022 with progressive worsening in the past 4 months. Patient admits to associated swelling. She states the pain is limiting her quality of life at this time and makes walking difficult. Patient denies CP, SOB, palpitations, dizziness, fever, or chills. 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 Miranda on 4/21/25.

## 2025-04-11 NOTE — H&P PST ADULT - PROBLEM SELECTOR PLAN 2
-Instructed to use albuterol inhaler the morning of surgery  -Follow up with PCP for routine management

## 2025-04-11 NOTE — H&P PST ADULT - ASSESSMENT
Cheryl Perales is a 65 year old female with PMH significant for mild COPD, former smoker, GI bleed () required 2 blood transfusions, anemia (, has since resolved but follows with hematologist), and esophageal spasms. Patient reports experiencing a burning and throbbing sensation to her right foot, between her 2nd and 3rd toe, since  with progressive worsening in the past 4 months. Patient admits to associated swelling. She states the pain is limiting her quality of life at this time and makes walking difficult. Patient denies CP, SOB, palpitations, dizziness, fever, or chills. 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 Miranda on 25.      -Medical evaluation pending  -Patient educated on NPO after midnight  -Instructed to continue medications as prescribed, no medications on day of surgery   -Educated on NSAIDS, multivitamins and herbals that increase the risk of bleeding and need to be stopped 5 days before procedure  -Educated on infection prevention  -Tylenol can be taken if needed for pain  -Verbalized understanding of all instructions.      CAPRINI SCORE    AGE RELATED RISK FACTORS                                                             [ ] Age 41-60 years                                            (1 Point)  [x ] Age: 61-74 years                                           (2 Points)                 [ ] Age= 75 years                                                (3 Points)             DISEASE RELATED RISK FACTORS                                                       [ ] Edema in the lower extremities                 (1 Point)                     [ ] Varicose veins                                               (1 Point)                                 [ ] BMI > 25 Kg/m2                                            (1 Point)                                  [ ] Serious infection (ie PNA)                            (1 Point)                     [ ] Lung disease ( COPD, Emphysema)            (1 Point)                                                                          [ ] Acute myocardial infarction                         (1 Point)                  [ ] Congestive heart failure (in the previous month)  (1 Point)         [ ] Inflammatory bowel disease                            (1 Point)                  [ ] Central venous access, PICC or Port               (2 points)       (within the last month)                                                                [ ] Stroke (in the previous month)                        (5 Points)    [ ] Previous or present malignancy                       (2 points)                                                                                                                                                         HEMATOLOGY RELATED FACTORS                                                         [ ] Prior episodes of VTE                                     (3 Points)                     [ ] Positive family history for VTE                      (3 Points)                  [ ] Prothrombin 44953 A                                     (3 Points)                     [ ] Factor V Leiden                                                (3 Points)                        [ ] Lupus anticoagulants                                      (3 Points)                                                           [ ] Anticardiolipin antibodies                              (3 Points)                                                       [ ] High homocysteine in the blood                   (3 Points)                                             [ ] Other congenital or acquired thrombophilia      (3 Points)                                                [ ] Heparin induced thrombocytopenia                  (3 Points)                                        MOBILITY RELATED FACTORS  [ ] Bed rest                                                         (1 Point)  [ ] Plaster cast                                                    (2 points)  [ ] Bed bound for more than 72 hours           (2 Points)    GENDER SPECIFIC FACTORS  [ ] Pregnancy or had a baby within the last month   (1 Point)  [ ] Post-partum < 6 weeks                                   (1 Point)  [ ] Hormonal therapy  or oral contraception   (1 Point)  [ ] History of pregnancy complications              (1 point)  [ ] Unexplained or recurrent              (1 Point)    OTHER RISK FACTORS                                           (1 Point)  [ ] BMI >40, smoking, diabetes requiring insulin, chemotherapy  blood transfusions and length of surgery over 2 hours    SURGERY RELATED RISK FACTORS  [ ]  Section within the last month     (1 Point)  [ ] Minor surgery                                                  (1 Point)  [ ] Arthroscopic surgery                                       (2 Points)  [ ] Planned major surgery lasting more            (2 Points)      than 45 minutes     [ ] Elective hip or knee joint replacement       (5 points)       surgery                                                TRAUMA RELATED RISK FACTORS  [ ] Fracture of the hip, pelvis, or leg                       (5 Points)  [ ] Spinal cord injury resulting in paralysis             (5 points)       (in the previous month)    [ ] Paralysis  (less than 1 month)                             (5 Points)  [ ] Multiple Trauma within 1 month                        (5 Points)    Total Score [        ]    Caprini Score 0-2: Low Risk, NO VTE prophylaxis required for most patients, encourage ambulation  Caprini Score 3-6: Moderate Risk , pharmacologic VTE prophylaxis is indicated for most patients (in the absence of contraindications)  Caprini Score Greater than or =7: High risk, pharmocologic VTE prophylaxis indicated for most patients (in the absence of contraindications)      OPIOID RISK TOOL    DENICE EACH BOX THAT APPLIES AND ADD TOTALS AT THE END    FAMILY HISTORY OF SUBSTANCE ABUSE                 FEMALE         MALE                                                Alcohol                             [  ]1 pt          [  ]3pts                                               Illegal Durgs                     [  ]2 pts        [  ]3pts                                               Rx Drugs                           [  ]4 pts        [  ]4 pts    PERSONAL HISTORY OF SUBSTANCE ABUSE                                                                                          Alcohol                             [  ]3 pts       [  ]3 pts                                               Illegal Drugs                     [  ]4 pts        [  ]4 pts                                               Rx Drugs                           [  ]5 pts        [  ]5 pts    AGE BETWEEN 16-45 YEARS                                      [  ]1 pt         [  ]1 pt    HISTORY OF PREADOLESCENT   SEXUAL ABUSE                                                             [  ]3 pts        [  ]0pts    PSYCHOLOGICAL DISEASE                     ADD, OCD, Bipolar, Schizophrenia        [  ]2 pts         [  ]2 pts                      Depression                                               [  ]1 pt           [  ]1 pt           SCORING TOTAL   (add numbers and type here)              (0)                                     A score of 3 or lower indicated LOW risk for future opioid abuse  A score of 4 to 7 indicated moderate risk for future opioid abuse  A score of 8 or higher indicates a high risk for opioid abuse                         Cheryl Perales is a 65 year old female with PMH significant for mild COPD, former smoker, GI bleed () required 2 blood transfusions, anemia (, has since resolved but follows with hematologist), and esophageal spasms. Patient reports experiencing a burning and throbbing sensation to her right foot, between her 2nd and 3rd toe, since  with progressive worsening in the past 4 months. Patient admits to associated swelling. She states the pain is limiting her quality of life at this time and makes walking difficult. Patient denies CP, SOB, palpitations, dizziness, fever, or chills. 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 Miranda on 25.      -Medical evaluation pending  -Patient educated on NPO after midnight  -Instructed to continue medications as prescribed, no medications on day of surgery   -Educated on NSAIDS, multivitamins and herbals that increase the risk of bleeding and need to be stopped 5 days before procedure  -Educated on infection prevention  -Tylenol can be taken if needed for pain  -Verbalized understanding of all instructions.      CAPRINI SCORE    AGE RELATED RISK FACTORS                                                             [ ] Age 41-60 years                                            (1 Point)  [x ] Age: 61-74 years                                           (2 Points)                 [ ] Age= 75 years                                                (3 Points)             DISEASE RELATED RISK FACTORS                                                       [ ] Edema in the lower extremities                 (1 Point)                     [ ] Varicose veins                                               (1 Point)                                 [ ] BMI > 25 Kg/m2                                            (1 Point)                                  [ ] Serious infection (ie PNA)                            (1 Point)                     [x ] Lung disease ( COPD, Emphysema)            (1 Point)                                                                          [ ] Acute myocardial infarction                         (1 Point)                  [ ] Congestive heart failure (in the previous month)  (1 Point)         [ ] Inflammatory bowel disease                            (1 Point)                  [ ] Central venous access, PICC or Port               (2 points)       (within the last month)                                                                [ ] Stroke (in the previous month)                        (5 Points)    [ ] Previous or present malignancy                       (2 points)                                                                                                                                                         HEMATOLOGY RELATED FACTORS                                                         [ ] Prior episodes of VTE                                     (3 Points)                     [ ] Positive family history for VTE                      (3 Points)                  [ ] Prothrombin 10344 A                                     (3 Points)                     [ ] Factor V Leiden                                                (3 Points)                        [ ] Lupus anticoagulants                                      (3 Points)                                                           [ ] Anticardiolipin antibodies                              (3 Points)                                                       [ ] High homocysteine in the blood                   (3 Points)                                             [ ] Other congenital or acquired thrombophilia      (3 Points)                                                [ ] Heparin induced thrombocytopenia                  (3 Points)                                        MOBILITY RELATED FACTORS  [ ] Bed rest                                                         (1 Point)  [ ] Plaster cast                                                    (2 points)  [ ] Bed bound for more than 72 hours           (2 Points)    GENDER SPECIFIC FACTORS  [ ] Pregnancy or had a baby within the last month   (1 Point)  [ ] Post-partum < 6 weeks                                   (1 Point)  [ ] Hormonal therapy  or oral contraception   (1 Point)  [ ] History of pregnancy complications              (1 point)  [ ] Unexplained or recurrent              (1 Point)    OTHER RISK FACTORS                                           (1 Point)  [ ] BMI >40, smoking, diabetes requiring insulin, chemotherapy  blood transfusions and length of surgery over 2 hours    SURGERY RELATED RISK FACTORS  [ ]  Section within the last month     (1 Point)  [ ] Minor surgery                                                  (1 Point)  [ ] Arthroscopic surgery                                       (2 Points)  [x ] Planned major surgery lasting more            (2 Points)      than 45 minutes     [ ] Elective hip or knee joint replacement       (5 points)       surgery                                                TRAUMA RELATED RISK FACTORS  [ ] Fracture of the hip, pelvis, or leg                       (5 Points)  [ ] Spinal cord injury resulting in paralysis             (5 points)       (in the previous month)    [ ] Paralysis  (less than 1 month)                             (5 Points)  [ ] Multiple Trauma within 1 month                        (5 Points)    Total Score [     5   ]    Caprini Score 0-2: Low Risk, NO VTE prophylaxis required for most patients, encourage ambulation  Caprini Score 3-6: Moderate Risk , pharmacologic VTE prophylaxis is indicated for most patients (in the absence of contraindications)  Caprini Score Greater than or =7: High risk, pharmocologic VTE prophylaxis indicated for most patients (in the absence of contraindications)      OPIOID RISK TOOL    DENICE EACH BOX THAT APPLIES AND ADD TOTALS AT THE END    FAMILY HISTORY OF SUBSTANCE ABUSE                 FEMALE         MALE                                                Alcohol                             [  ]1 pt          [  ]3pts                                               Illegal Durgs                     [  ]2 pts        [  ]3pts                                               Rx Drugs                           [  ]4 pts        [  ]4 pts    PERSONAL HISTORY OF SUBSTANCE ABUSE                                                                                          Alcohol                             [  ]3 pts       [  ]3 pts                                               Illegal Drugs                     [  ]4 pts        [  ]4 pts                                               Rx Drugs                           [  ]5 pts        [  ]5 pts    AGE BETWEEN 16-45 YEARS                                      [  ]1 pt         [  ]1 pt    HISTORY OF PREADOLESCENT   SEXUAL ABUSE                                                             [  ]3 pts        [  ]0pts    PSYCHOLOGICAL DISEASE                     ADD, OCD, Bipolar, Schizophrenia        [  ]2 pts         [  ]2 pts                      Depression                                               [  ]1 pt           [  ]1 pt           SCORING TOTAL   (add numbers and type here)              (0)                                     A score of 3 or lower indicated LOW risk for future opioid abuse  A score of 4 to 7 indicated moderate risk for future opioid abuse  A score of 8 or higher indicates a high risk for opioid abuse

## 2025-04-11 NOTE — H&P PST ADULT - PROBLEM SELECTOR PLAN 1
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 Miranda on 4/21/25.    -pending medical optimization  -pending labs and ekg

## 2025-04-11 NOTE — H&P PST ADULT - NSICDXPASTMEDICALHX_GEN_ALL_CORE_FT
PAST MEDICAL HISTORY:  Anemia     COPD, mild     H/O: GI bleed     History of blood transfusion      PAST MEDICAL HISTORY:  Anemia     COPD, mild     H/O esophageal spasm     H/O: GI bleed     History of blood transfusion     Lesion of plantar nerve, right lower limb

## 2025-04-11 NOTE — H&P PST ADULT - NSICDXFAMILYHX_GEN_ALL_CORE_FT
FAMILY HISTORY:  Grandparent  Still living? Unknown  FH: heart disease, Age at diagnosis: Age Unknown    Aunt  Still living? No  FH: heart disease, Age at diagnosis: Age Unknown

## 2025-05-29 PROBLEM — Z87.19 PERSONAL HISTORY OF OTHER DISEASES OF THE DIGESTIVE SYSTEM: Chronic | Status: ACTIVE | Noted: 2025-04-11

## 2025-05-29 PROBLEM — Z92.89 PERSONAL HISTORY OF OTHER MEDICAL TREATMENT: Chronic | Status: ACTIVE | Noted: 2025-04-11

## 2025-05-29 PROBLEM — G57.61 LESION OF PLANTAR NERVE, RIGHT LOWER LIMB: Chronic | Status: ACTIVE | Noted: 2025-04-11

## 2025-05-29 PROBLEM — D64.9 ANEMIA, UNSPECIFIED: Chronic | Status: ACTIVE | Noted: 2025-04-11

## 2025-05-29 PROBLEM — J44.9 CHRONIC OBSTRUCTIVE PULMONARY DISEASE, UNSPECIFIED: Chronic | Status: ACTIVE | Noted: 2025-04-11

## 2025-06-25 ENCOUNTER — EMERGENCY (EMERGENCY)
Facility: HOSPITAL | Age: 65
LOS: 1 days | End: 2025-06-25
Attending: STUDENT IN AN ORGANIZED HEALTH CARE EDUCATION/TRAINING PROGRAM
Payer: COMMERCIAL

## 2025-06-25 VITALS
SYSTOLIC BLOOD PRESSURE: 152 MMHG | OXYGEN SATURATION: 97 % | RESPIRATION RATE: 18 BRPM | HEART RATE: 73 BPM | TEMPERATURE: 99 F | DIASTOLIC BLOOD PRESSURE: 82 MMHG

## 2025-06-25 DIAGNOSIS — Z90.89 ACQUIRED ABSENCE OF OTHER ORGANS: Chronic | ICD-10-CM

## 2025-06-25 DIAGNOSIS — Z90.710 ACQUIRED ABSENCE OF BOTH CERVIX AND UTERUS: Chronic | ICD-10-CM

## 2025-06-25 LAB
ALBUMIN SERPL ELPH-MCNC: 4.2 G/DL — SIGNIFICANT CHANGE UP (ref 3.3–5.2)
ALP SERPL-CCNC: 84 U/L — SIGNIFICANT CHANGE UP (ref 40–120)
ALT FLD-CCNC: 9 U/L — SIGNIFICANT CHANGE UP
ANION GAP SERPL CALC-SCNC: 12 MMOL/L — SIGNIFICANT CHANGE UP (ref 5–17)
AST SERPL-CCNC: 18 U/L — SIGNIFICANT CHANGE UP
BASOPHILS # BLD AUTO: 0.03 K/UL — SIGNIFICANT CHANGE UP (ref 0–0.2)
BASOPHILS NFR BLD AUTO: 0.4 % — SIGNIFICANT CHANGE UP (ref 0–2)
BILIRUB SERPL-MCNC: 0.4 MG/DL — SIGNIFICANT CHANGE UP (ref 0.4–2)
BUN SERPL-MCNC: 9.5 MG/DL — SIGNIFICANT CHANGE UP (ref 8–20)
CALCIUM SERPL-MCNC: 9.6 MG/DL — SIGNIFICANT CHANGE UP (ref 8.4–10.5)
CHLORIDE SERPL-SCNC: 105 MMOL/L — SIGNIFICANT CHANGE UP (ref 96–108)
CO2 SERPL-SCNC: 23 MMOL/L — SIGNIFICANT CHANGE UP (ref 22–29)
CREAT SERPL-MCNC: 0.62 MG/DL — SIGNIFICANT CHANGE UP (ref 0.5–1.3)
EGFR: 99 ML/MIN/1.73M2 — SIGNIFICANT CHANGE UP
EGFR: 99 ML/MIN/1.73M2 — SIGNIFICANT CHANGE UP
EOSINOPHIL # BLD AUTO: 0.23 K/UL — SIGNIFICANT CHANGE UP (ref 0–0.5)
EOSINOPHIL NFR BLD AUTO: 3.2 % — SIGNIFICANT CHANGE UP (ref 0–6)
GLUCOSE SERPL-MCNC: 91 MG/DL — SIGNIFICANT CHANGE UP (ref 70–99)
HCT VFR BLD CALC: 39.1 % — SIGNIFICANT CHANGE UP (ref 34.5–45)
HGB BLD-MCNC: 13.2 G/DL — SIGNIFICANT CHANGE UP (ref 11.5–15.5)
IMM GRANULOCYTES # BLD AUTO: 0.02 K/UL — SIGNIFICANT CHANGE UP (ref 0–0.07)
IMM GRANULOCYTES NFR BLD AUTO: 0.3 % — SIGNIFICANT CHANGE UP (ref 0–0.9)
LACTATE SERPL-SCNC: 1 MMOL/L — SIGNIFICANT CHANGE UP (ref 0.5–2)
LIDOCAIN IGE QN: 26 U/L — SIGNIFICANT CHANGE UP (ref 22–51)
LYMPHOCYTES # BLD AUTO: 2.37 K/UL — SIGNIFICANT CHANGE UP (ref 1–3.3)
LYMPHOCYTES NFR BLD AUTO: 33.5 % — SIGNIFICANT CHANGE UP (ref 13–44)
MCHC RBC-ENTMCNC: 29.3 PG — SIGNIFICANT CHANGE UP (ref 27–34)
MCHC RBC-ENTMCNC: 33.8 G/DL — SIGNIFICANT CHANGE UP (ref 32–36)
MCV RBC AUTO: 86.7 FL — SIGNIFICANT CHANGE UP (ref 80–100)
MONOCYTES # BLD AUTO: 0.4 K/UL — SIGNIFICANT CHANGE UP (ref 0–0.9)
MONOCYTES NFR BLD AUTO: 5.6 % — SIGNIFICANT CHANGE UP (ref 2–14)
NEUTROPHILS # BLD AUTO: 4.03 K/UL — SIGNIFICANT CHANGE UP (ref 1.8–7.4)
NEUTROPHILS NFR BLD AUTO: 57 % — SIGNIFICANT CHANGE UP (ref 43–77)
NRBC # BLD AUTO: 0 K/UL — SIGNIFICANT CHANGE UP (ref 0–0)
NRBC # FLD: 0 K/UL — SIGNIFICANT CHANGE UP (ref 0–0)
NRBC BLD AUTO-RTO: 0 /100 WBCS — SIGNIFICANT CHANGE UP (ref 0–0)
PLATELET # BLD AUTO: 251 K/UL — SIGNIFICANT CHANGE UP (ref 150–400)
PMV BLD: 10.4 FL — SIGNIFICANT CHANGE UP (ref 7–13)
POTASSIUM SERPL-MCNC: 4.4 MMOL/L — SIGNIFICANT CHANGE UP (ref 3.5–5.3)
POTASSIUM SERPL-SCNC: 4.4 MMOL/L — SIGNIFICANT CHANGE UP (ref 3.5–5.3)
PROT SERPL-MCNC: 6.8 G/DL — SIGNIFICANT CHANGE UP (ref 6.6–8.7)
RBC # BLD: 4.51 M/UL — SIGNIFICANT CHANGE UP (ref 3.8–5.2)
RBC # FLD: 12.7 % — SIGNIFICANT CHANGE UP (ref 10.3–14.5)
SODIUM SERPL-SCNC: 140 MMOL/L — SIGNIFICANT CHANGE UP (ref 135–145)
WBC # BLD: 7.08 K/UL — SIGNIFICANT CHANGE UP (ref 3.8–10.5)
WBC # FLD AUTO: 7.08 K/UL — SIGNIFICANT CHANGE UP (ref 3.8–10.5)

## 2025-06-25 PROCEDURE — 83605 ASSAY OF LACTIC ACID: CPT

## 2025-06-25 PROCEDURE — 74177 CT ABD & PELVIS W/CONTRAST: CPT

## 2025-06-25 PROCEDURE — 85025 COMPLETE CBC W/AUTO DIFF WBC: CPT

## 2025-06-25 PROCEDURE — 99285 EMERGENCY DEPT VISIT HI MDM: CPT

## 2025-06-25 PROCEDURE — 83690 ASSAY OF LIPASE: CPT

## 2025-06-25 PROCEDURE — 96374 THER/PROPH/DIAG INJ IV PUSH: CPT | Mod: XU

## 2025-06-25 PROCEDURE — 80053 COMPREHEN METABOLIC PANEL: CPT

## 2025-06-25 PROCEDURE — 99284 EMERGENCY DEPT VISIT MOD MDM: CPT | Mod: 25

## 2025-06-25 PROCEDURE — 36415 COLL VENOUS BLD VENIPUNCTURE: CPT

## 2025-06-25 PROCEDURE — 74177 CT ABD & PELVIS W/CONTRAST: CPT | Mod: 26

## 2025-06-25 RX ORDER — ACETAMINOPHEN 500 MG/5ML
1000 LIQUID (ML) ORAL ONCE
Refills: 0 | Status: COMPLETED | OUTPATIENT
Start: 2025-06-25 | End: 2025-06-25

## 2025-06-25 RX ADMIN — Medication 400 MILLIGRAM(S): at 21:58

## 2025-06-25 RX ADMIN — Medication 1000 MILLILITER(S): at 21:58

## 2025-06-25 NOTE — ED PROVIDER NOTE - NSFOLLOWUPINSTRUCTIONS_ED_ALL_ED_FT
- Take tylenol and/or ibuprofen as needed for pain.     Swelling and Irritation of the Large Bowel (Epiploic Appendagitis): What to Know    Epiploic appendagitis (EA) is swelling and irritation of small pieces of fat that are attached to the outside of the large bowel, also called the large intestine.    These small pieces of fat are called epiploic appendages. EA causes sudden pain in the lower belly, most often on the left side.    EA is rare, and it usually goes away on its own. It can feel like other conditions of the belly, such as appendicitis, gallbladder attack, or diverticulitis.    What are the causes?  EA may be caused by:  Blocked blood flow due to a blood clot.  Twisting of the epiploic appendages.  Conditions that cause swelling of nearby tissue, such as:  Long-term diarrhea. This is when you have watery poop.  Crohn disease.  Ulcerative colitis.  Infection that spreads from somewhere else.  What increases the risk?  You're more likely to develop EA if:  You're 40–50 years old.  You're male.  You're overweight.  You eat large meals.  You have hernias or diverticulosis.  What are the signs or symptoms?  The most common symptom is lower belly pain that starts suddenly and can be very bad. The pain may get worse with coughing, stretching, or when pressing on your belly.    You may also have these symptoms. But they're not common:  Fever.  Feeling like throwing up or actually throwing up.  Watery poop or trouble pooping (constipation).  How is this diagnosed?  EA may be diagnosed based on:  Your symptoms.  A physical exam.  A CT scan. This is the best way to diagnose EA.  An ultrasound.  A blood test called a complete blood count (CBC).  Checking inside your belly with a scope that has a camera. This may be done to confirm the diagnosis.  How is this treated?  EA usually goes away on its own. But if you need treatment:  You may be given NSAIDs, such as aspirin or ibuprofen, to reduce pain and swelling.  You may need surgery to remove the appendages, if you don't get better or if the swelling and irritation keep coming back. This is rare.  Follow these instructions at home:  Take your medicines only as told.  Eat and drink as told.  Ask what things are safe for you to do at home. Ask when you can go back to school or work.  How is this prevented?  Eat smaller meals. Eating large meals can put pressure on the bowels, which can lead to EA.  Keep a healthy weight.  Contact a health care provider if:  You have a fever.  You feel like throwing up or you throw up, and the symptoms last a long time.  You have watery poop or trouble pooping, and the symptoms last a long time.  Your pain gets worse.  Your pain lasts longer than 10 days.  Get help right away if:  You have very bad pain that doesn't get better with medicine.

## 2025-06-25 NOTE — ED ADULT NURSE NOTE - OBJECTIVE STATEMENT
Pt A&Ox4 c/o suprapubic pain, abdominal distension, constipation since Sunday. Hx of colitis and anemia requiring blood transfusion. Denies CP, SOB, fevers. Endorses discomfort when urinating. Airway patent. Respirations even and unlabored. Pending provider assessment.

## 2025-06-25 NOTE — ED ADULT NURSE NOTE - NSFALLUNIVINTERV_ED_ALL_ED
Bed/Stretcher in lowest position, wheels locked, appropriate side rails in place/Call bell, personal items and telephone in reach/Instruct patient to call for assistance before getting out of bed/chair/stretcher/Non-slip footwear applied when patient is off stretcher/[place]red to call system/Physically safe environment - no spills, clutter or unnecessary equipment/Purposeful proactive rounding/Room/bathroom lighting operational, light cord in reach

## 2025-06-25 NOTE — ED PROVIDER NOTE - PHYSICAL EXAMINATION
Gen: AAOx3, NAD, well nourished  HEENT: Normocephalic atraumatic. EOMI. No scleral icterus. Moist mucus membranes.  CV: RRR. Audible S1 and S2. No murmurs. 2+ radial and PT pulses   Pulm: Clear to auscultation bilaterally. No wheezes, rales, or rhonchi. No accessory muscle use or respiratory distress.  Abdomen: soft, normoactive BS, non distended, RLQ and suprapubic ttp, no rebound, no guarding  Musculoskeletal:  Moving all extremities equally. No gross deformity. No tenderness to palpation.  Skin: No rashes or lesions. Warm.  Neurologic: No focal neurological deficits. CN II-XII grossly intact.  Psych: Appropriate mood and affect. Cooperative.

## 2025-06-25 NOTE — ED ADULT TRIAGE NOTE - CHIEF COMPLAINT QUOTE
pt comes in from urgent care with c/o abdominal pain 7/10, pt was seen and sent to the ER for a CT scan, pt states she thought the pain was constipation and took Miralax and colace PTA, pt has no significant medical history and not on any daily medications, no other complaints at this time

## 2025-06-25 NOTE — ED PROVIDER NOTE - ATTENDING CONTRIBUTION TO CARE
65y F w/ hx chronic anemia, esophageal dysmotility; presents for few days of lower abdominal pain. No fever, vomiting, diarrhea, urinary symptoms. On my exam pt well appearing and in no distress. Abdomen soft with mild b/l lower quadrant tenderness, no rebound or guarding. CT suggestive of epiploic appendagitis. No emergent findings on labs. Medically stable for discharge.

## 2025-06-25 NOTE — ED PROVIDER NOTE - PATIENT PORTAL LINK FT
You can access the FollowMyHealth Patient Portal offered by [facility] by registering at the following website: [email or web address]. By joining [employer]s FollowMyHealth portal, you will also be able to view your health information using other applications (apps) compatible with our system.

## 2025-06-25 NOTE — ED PROVIDER NOTE - OBJECTIVE STATEMENT
Patient is a 66yo F with PMHx of chronic anemia on iron, esophageal dysmotility who presents to the ED complaining of lower abdominal pain since Saturday. Took colace and miralax because she is sometimes constipated. She had liquid diarrhea afterwards but no change in the pain. Went to urgent care and sent to ED for eval. Denies blood in stool. Has dark stools at baseline due to iron. Denies nausea, vomiting, fevers, chills.

## 2025-06-25 NOTE — ED PROVIDER NOTE - CLINICAL SUMMARY MEDICAL DECISION MAKING FREE TEXT BOX
Patient is a 64yo F with PMHx of chronic anemia on iron, esophageal dysmotility who presents to the ED complaining of lower abdominal pain since Saturday. Will treat pain and get CT abd/pel

## 2025-06-25 NOTE — ED ADULT NURSE REASSESSMENT NOTE - NS ED NURSE REASSESS COMMENT FT1
Assumed care of pt from jolie WARE at 2300. Pt is resting comfortably in stretcher. NAD. Pt is A&Ox4. Respirations are even and unlabored on ra.

## 2025-06-26 VITALS
TEMPERATURE: 97 F | RESPIRATION RATE: 18 BRPM | DIASTOLIC BLOOD PRESSURE: 84 MMHG | SYSTOLIC BLOOD PRESSURE: 122 MMHG | HEART RATE: 60 BPM | OXYGEN SATURATION: 98 %

## 2025-07-01 ENCOUNTER — NON-APPOINTMENT (OUTPATIENT)
Age: 65
End: 2025-07-01

## 2025-07-01 ENCOUNTER — APPOINTMENT (OUTPATIENT)
Dept: OBGYN | Facility: CLINIC | Age: 65
End: 2025-07-01
Payer: COMMERCIAL

## 2025-07-01 VITALS
SYSTOLIC BLOOD PRESSURE: 125 MMHG | BODY MASS INDEX: 18.16 KG/M2 | DIASTOLIC BLOOD PRESSURE: 77 MMHG | HEIGHT: 66 IN | WEIGHT: 113 LBS | HEART RATE: 73 BPM

## 2025-07-01 PROCEDURE — 99459 PELVIC EXAMINATION: CPT | Mod: NC

## 2025-07-01 PROCEDURE — 99396 PREV VISIT EST AGE 40-64: CPT
